# Patient Record
Sex: FEMALE | Race: WHITE
[De-identification: names, ages, dates, MRNs, and addresses within clinical notes are randomized per-mention and may not be internally consistent; named-entity substitution may affect disease eponyms.]

---

## 2019-09-16 ENCOUNTER — HOSPITAL ENCOUNTER (OUTPATIENT)
Dept: HOSPITAL 53 - M RADPRO | Age: 39
End: 2019-09-16
Attending: ORTHOPAEDIC SURGERY
Payer: COMMERCIAL

## 2019-09-16 DIAGNOSIS — M25.511: Primary | ICD-10-CM

## 2019-09-16 DIAGNOSIS — M75.21: ICD-10-CM

## 2019-09-16 PROCEDURE — 77002 NEEDLE LOCALIZATION BY XRAY: CPT

## 2019-09-16 PROCEDURE — 23350 INJECTION FOR SHOULDER X-RAY: CPT

## 2019-09-16 PROCEDURE — 73223 MRI JOINT UPR EXTR W/O&W/DYE: CPT

## 2019-09-16 NOTE — REP
Reason For Exam/Comment: Pain in right shoulder

 

Procedure:

Right shoulder MRI  arthrogram

 

The procedure was performed by PHOENIX Bloom,  under the direct

supervision of Dr. Small.

 

The benefits and risks including but not limited to pain, infection, bleeding and

anaphylaxis were explained to the patient and informed consent was obtained both

verbally and written. Directly prior to the start of the procedure, a formal

timeout was completed in the procedure room.

 

Technique:

The right glenohumeral joint space was localized using fluoroscopic guidance.

The skin was prepped and draped in the usual sterile fashion.  5 mL of 1%

lidocaine was used as a local anesthetic.  Using fluoroscopic guidance a 22-gauge

spinal needle was inserted and advanced to the right glenohumeral joint space. 1

mL of Conray 43 was injected to verify needle placement. 12 mL of a solution

containing 20 ml of sterile saline and a 0.15 ml of ProHance was injected into

the joint.  The needle was removed and the patient was taken MRI for post

procedural imaging.

 

The patient tolerated the procedure well and there were no immediate

complications.

 

0.1 minutes of fluoroscopy time was utilized for this procedure. Some

fluoroscopic images are performed with last image hold technology.  These images

require no additional radiation.

 

 

Reviewed by

PHOENIX Chapa 09/16/2019 08:21 A

Electronically Signed by

Triston Small MD 09/16/2019 04:05 P

## 2019-09-16 NOTE — REP
ARTHROGRAM, RIGHT SHOULDER:

 

TECHNIQUE:  Axial T2 fat sat, coronal oblique T1, T2 fat sat, post arthrogram

axial T1 fat sat, proton density, coronal oblique T1 fat sat, T2 sat, sagittal

oblique T2 fat sat, ABER T1 fat sat.

 

There is mild ill-defined high signal in the supraspinatus tendon compatible with

mild tendinopathy/tendinitis.  No rotator cuff tear is seen.  There are mild

hypertrophic degenerative changes of the acromioclavicular joint.  There is a

type 1 acromion.  Biceps tendon is within the bicipital groove with no

tenosynovitis.  There is no Hill-Sachs deformity.  The deltoid muscle

demonstrates no abnormal signal.  Biceps labral complex is intact.  No labral

tear is seen.   There is no paralabral cyst.  There is a normal amount of joint

fluid.  There is a tiny amount of fluid in the subacromial/subdeltoid bursae.

 

IMPRESSION:

 

Very mild tendinopathy/tendinitis, supraspinatus tendon. Mild hypertrophic

degenerative changes of the acromioclavicular joint with type 1 acromion.  No

labral tear.  Mild fluid in the subacromial subdeltoid bursae may indicate an

element of bursitis.

 

 

Electronically Signed by

Triston Small MD 09/16/2019 04:08 P

## 2019-10-20 ENCOUNTER — HOSPITAL ENCOUNTER (OUTPATIENT)
Dept: HOSPITAL 53 - M LRY | Age: 39
End: 2019-10-20
Attending: NURSE PRACTITIONER
Payer: COMMERCIAL

## 2019-10-20 DIAGNOSIS — R06.02: Primary | ICD-10-CM

## 2019-10-20 NOTE — REP
Chest x-ray:  Two views.

 

History: Shortness of breath .

 

Comparison study: No comparison study .

 

Findings:  The lungs are well inflated and free of infiltrate.  The pleural

angles are sharp.  The heart size is normal.  Pulmonary vasculature is not

increased.  No significant bony abnormality is seen.

 

Impression:

 

Negative chest x-ray.

 

 

Electronically Signed by

Mitch Vazquez MD 10/20/2019 05:09 P